# Patient Record
Sex: FEMALE | Race: BLACK OR AFRICAN AMERICAN | NOT HISPANIC OR LATINO | Employment: UNEMPLOYED | URBAN - METROPOLITAN AREA
[De-identification: names, ages, dates, MRNs, and addresses within clinical notes are randomized per-mention and may not be internally consistent; named-entity substitution may affect disease eponyms.]

---
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
## 2022-05-09 ENCOUNTER — TELEPHONE (OUTPATIENT)
Dept: OBSTETRICS AND GYNECOLOGY | Facility: CLINIC | Age: 22
End: 2022-05-09
Payer: COMMERCIAL

## 2022-05-09 NOTE — TELEPHONE ENCOUNTER
----- Message from Carrie Almonte sent at 5/9/2022 10:57 AM CDT -----  Patient returning  truman lto nurse. Call back number [phone]. Janine

## 2022-05-13 ENCOUNTER — OFFICE VISIT (OUTPATIENT)
Dept: OBSTETRICS AND GYNECOLOGY | Facility: CLINIC | Age: 22
End: 2022-05-13
Payer: COMMERCIAL

## 2022-05-13 VITALS
BODY MASS INDEX: 28.32 KG/M2 | SYSTOLIC BLOOD PRESSURE: 137 MMHG | HEIGHT: 65 IN | DIASTOLIC BLOOD PRESSURE: 90 MMHG | WEIGHT: 170 LBS | HEART RATE: 86 BPM

## 2022-05-13 DIAGNOSIS — Z20.2 POTENTIAL EXPOSURE TO STD: ICD-10-CM

## 2022-05-13 DIAGNOSIS — Z01.419 WELL WOMAN EXAM WITH ROUTINE GYNECOLOGICAL EXAM: Primary | ICD-10-CM

## 2022-05-13 PROCEDURE — 99385 PR PREVENTIVE VISIT,NEW,18-39: ICD-10-PCS | Mod: S$GLB,,, | Performed by: OBSTETRICS & GYNECOLOGY

## 2022-05-13 PROCEDURE — 99385 PREV VISIT NEW AGE 18-39: CPT | Mod: S$GLB,,, | Performed by: OBSTETRICS & GYNECOLOGY

## 2022-05-13 RX ORDER — ETONOGESTREL 68 MG/1
68 IMPLANT SUBCUTANEOUS ONCE
COMMUNITY
End: 2022-07-13 | Stop reason: ALTCHOICE

## 2022-05-13 NOTE — PROGRESS NOTES
Cristin Dee is a 21 y.o. female  who presents for a well woman exam.  She has no issues, problems, or complaints. Has had her second Nexplanon in for 1 year now. Periods irregular but not as painful. She is interested in an IUD since she is still in school and no pregnancy plans for quite a while. She also wants one that would decrease or eliminate her periods since she had Nexplanon inserted for that reason as well.    History reviewed. No pertinent past medical history.    History reviewed. No pertinent surgical history.    OB History    Para Term  AB Living   0 0 0 0 0 0   SAB IAB Ectopic Multiple Live Births   0 0 0 0 0       Family History   Problem Relation Age of Onset    No Known Problems Mother     Hypertension Father     No Known Problems Sister     No Known Problems Brother     No Known Problems Maternal Grandmother     No Known Problems Maternal Grandfather     No Known Problems Paternal Grandmother     No Known Problems Paternal Grandfather        Social History     Tobacco Use    Smoking status: Never Smoker    Smokeless tobacco: Never Used   Substance Use Topics    Alcohol use: Yes     Alcohol/week: 1.0 standard drink     Types: 1 Standard drinks or equivalent per week    Drug use: Not Currently         Current Outpatient Medications:     etonogestreL (NEXPLANON) 68 mg Impl subdermal device, 68 mg by Subdermal route once. A single NEXPLANON implant is inserted subdermally just under the skin at the inner side of the non-dominant upper arm., Disp: , Rfl:      Review of patient's allergies indicates:  No Known Allergies     ROS:  GENERAL: Denies weight gain or weight loss. Feeling well overall.   SKIN: Denies rash or lesions.   HEAD: Denies head injury or headache.   NODES: Denies enlarged lymph nodes.   CHEST: Denies shortness of breath.   CARDIOVASCULAR: Denies palpitations or chest pain.   ABDOMEN: Denies abdominal pain, constipation, diarrhea, nausea, vomiting or  "rectal bleeding.   URINARY: Denies frequency, dysuria, hematuria, or burning on urination.  REPRODUCTIVE: See HPI.   BREASTS: Denies pain, lumps, or nipple discharge.   HEMATOLOGIC: Denies easy bruisability or excessive bleeding.  MUSCULOSKELETAL: Denies joint pain or swelling.   NEUROLOGIC: Denies syncope or weakness.   PSYCHIATRIC: Denies depression, anxiety or mood swings.    PHYSICAL EXAM:    BP (!) 137/90   Pulse 86   Ht 5' 5" (1.651 m)   Wt 77.1 kg (170 lb)   LMP 05/13/2022 (Approximate)   BMI 28.29 kg/m²    Body mass index is 28.29 kg/m².     APPEARANCE: Well nourished, well developed, in no acute distress.  AFFECT: WNL, alert and oriented x 3  SKIN: No acne or hirsutism  ABDOMEN: Soft.  No tenderness or masses.  No hepatosplenomegaly.  No hernias.  BREASTS: Symmetrical, no skin changes or visible lesions.  No palpable masses, nipple discharge bilaterally.  PELVIC: Normal external genitalia without lesions.  Normal hair distribution.  Adequate perineal body, normal urethral meatus.  Urethra and bladder without tenderness or masses. Vagina moist and well rugated without lesions or discharge.  Cervix pink, without lesions, discharge or tenderness.  No significant cystocele or rectocele.  Bimanual exam shows uterus to be normal size, regular, mobile and nontender.  Adnexa without masses or tenderness.        Well woman exam with routine gynecological exam  -     C. trachomatis/N. gonorrhoeae by AMP DNA Other; Cervicovaginal    Potential exposure to STD  -     C. trachomatis/N. gonorrhoeae by AMP DNA Other; Cervicovaginal       Mirena IUD discussed and forms completed to order device  Patient was counseled today on A.C.S. Pap guidelines and recommendations for yearly pelvic exams, mammograms and monthly self breast exams; to see her PCP for other health maintenance.     Follow up in 1 year          "

## 2022-05-17 LAB
CHLAMYDIA: NEGATIVE
GONORRHEA: NEGATIVE
SOURCE: NORMAL

## 2022-05-19 ENCOUNTER — TELEPHONE (OUTPATIENT)
Dept: OBSTETRICS AND GYNECOLOGY | Facility: CLINIC | Age: 22
End: 2022-05-19
Payer: COMMERCIAL

## 2022-05-19 NOTE — TELEPHONE ENCOUNTER
----- Message from Carrie Almonte sent at 5/19/2022 10:16 AM CDT -----   Patient's mother need to speak to nurse regarding her medication. Call back number [phone]. Tks

## 2022-05-19 NOTE — TELEPHONE ENCOUNTER
----- Message from Haylie Zavala sent at 5/19/2022 12:25 PM CDT -----  Cristin Dee stated that she received a call stating that her iud will be delivered to the office on the 24th and she would like to schedule the insert procedure. Please give her a call back at [phone]

## 2022-05-20 NOTE — TELEPHONE ENCOUNTER
[facility] was calling to inquire why patient had Nexplanon and Mirena was ordered. [facility] was told that she still had irregular cycles with the Nexplanon. Mirena will be delivered May 24, 2022

## 2022-05-20 NOTE — TELEPHONE ENCOUNTER
----- Message from Carrie Almonte sent at 5/20/2022  9:29 AM CDT -----  Angeles calling from [facility] need to speak to nurse regarding clarification on  prescription. Call back number [phone]. Tks

## 2022-05-24 ENCOUNTER — TELEPHONE (OUTPATIENT)
Dept: OBSTETRICS AND GYNECOLOGY | Facility: CLINIC | Age: 22
End: 2022-05-24
Payer: COMMERCIAL

## 2022-05-24 NOTE — TELEPHONE ENCOUNTER
Spoke with Pt and she states that she and her mother had a 3 way conversation with the insurance company and that the Mirena is 100% covered

## 2022-05-26 ENCOUNTER — OFFICE VISIT (OUTPATIENT)
Dept: OBSTETRICS AND GYNECOLOGY | Facility: CLINIC | Age: 22
End: 2022-05-26
Payer: COMMERCIAL

## 2022-05-26 VITALS
BODY MASS INDEX: 28.09 KG/M2 | WEIGHT: 168.81 LBS | SYSTOLIC BLOOD PRESSURE: 120 MMHG | DIASTOLIC BLOOD PRESSURE: 71 MMHG

## 2022-05-26 DIAGNOSIS — Z30.430 ENCOUNTER FOR INSERTION OF PROGESTIN-RELEASING INTRAUTERINE CONTRACEPTIVE DEVICE (IUD): Primary | ICD-10-CM

## 2022-05-26 DIAGNOSIS — Z30.46 NEXPLANON REMOVAL: ICD-10-CM

## 2022-05-26 PROCEDURE — 58300 INSERTION OF IUD: ICD-10-PCS | Mod: S$GLB,,, | Performed by: OBSTETRICS & GYNECOLOGY

## 2022-05-26 PROCEDURE — 11982 PR REMOVAL DRUG IMPLANT DEVICE: ICD-10-PCS | Mod: S$GLB,,, | Performed by: OBSTETRICS & GYNECOLOGY

## 2022-05-26 PROCEDURE — 11982 REMOVE DRUG IMPLANT DEVICE: CPT | Mod: S$GLB,,, | Performed by: OBSTETRICS & GYNECOLOGY

## 2022-05-26 PROCEDURE — 58300 INSERT INTRAUTERINE DEVICE: CPT | Mod: S$GLB,,, | Performed by: OBSTETRICS & GYNECOLOGY

## 2022-05-26 NOTE — PROGRESS NOTES
Insertion of IUD    Date/Time: 2022 11:15 AM  Performed by: Marielena Vasquez MD  Authorized by: Marielena Vasquez MD     Consent:     Consent obtained:  Verbal    Consent given by:  Patient    Procedure risks and benefits discussed: yes      Patient questions answered: yes      Patient agrees, verbalizes understanding, and wants to proceed: yes    Procedure:     Negative GC/chlamydia test: yes      Cervix cleaned and prepped: yes      Speculum placed in vagina: yes      Tenaculum applied to cervix: yes      Uterus sounded: yes      Uterus sound depth (cm):  7    IUD inserted with no complications: yes      IUD type:  Mirena    Strings trimmed: yes    1 Intra Uterine Device levonorgestreL 20 mcg/24 hours (7 yrs) 52 mg       Post-procedure:     Patient tolerated procedure well: yes          CC: Nexplanon removal    Cristin Dee is a 21 y.o. female   here for NEXPLANON removal  due to irregular menses Patient's last menstrual period was 2022 (approximate).     COUNSELING:  All contraceptive options were reviewed and the patient chooses Mirena IUD after removal of nexplanon.  The procedure and minimal risks of pain, bleeding, bruising and infection at the removal site discussed. Possible irregular menstrual bleeding pattern versus amenorrhea was explained.    EXAM:  With patient in supine position the left arm was flexed at the elbow and externally rotated.  The nexplanon was identified at the inner side of the upper arm overlying the groove between biceps and triceps.    PROCEDURE:    The removal site was prepped with Betadyne and injected with 1 mL of 1% Lidocaine with epinephrine subcutaneously.  Using sterile technique a 11 blade knife was used to make a small incision in the skin. The Nexplanon implant was seen and grasped with a hemostat and removed without difficulty. Adequate hemostasis was noted.  A small adhesive bandage and then a pressure bandage was placed over the removal site.  The patient  tolerated the procedure well.  EBL 1 mL.    ASSESSMENT AND PLAN:    Encounter for insertion of progestin-releasing intrauterine contraceptive device (IUD)  -     Insertion of IUD    Nexplanon removal

## 2022-06-29 ENCOUNTER — TELEPHONE (OUTPATIENT)
Dept: OBSTETRICS AND GYNECOLOGY | Facility: CLINIC | Age: 22
End: 2022-06-29
Payer: MEDICAID

## 2022-06-29 DIAGNOSIS — R10.2 PELVIC PAIN: ICD-10-CM

## 2022-06-29 DIAGNOSIS — Z30.431 IUD CHECK UP: Primary | ICD-10-CM

## 2022-06-30 ENCOUNTER — PROCEDURE VISIT (OUTPATIENT)
Dept: OBSTETRICS AND GYNECOLOGY | Facility: CLINIC | Age: 22
End: 2022-06-30
Payer: MEDICAID

## 2022-06-30 DIAGNOSIS — R10.2 PELVIC PAIN: ICD-10-CM

## 2022-06-30 DIAGNOSIS — Z30.431 IUD CHECK UP: ICD-10-CM

## 2022-06-30 PROCEDURE — 76830 TRANSVAGINAL US NON-OB: CPT | Mod: S$GLB,,, | Performed by: OBSTETRICS & GYNECOLOGY

## 2022-06-30 PROCEDURE — 76830 US OB/GYN PROCEDURE (VIEWPOINT): ICD-10-PCS | Mod: S$GLB,,, | Performed by: OBSTETRICS & GYNECOLOGY

## 2022-06-30 NOTE — TELEPHONE ENCOUNTER
"----- Message from Heather Esposito sent at 6/30/2022  8:44 AM CDT -----  Contact: Patient  Type:  Needs  Advice    Who Called: Patient    Does Dr Vasquez take the medical card "[employer]" ?    Patient has a appt today at 2:30pm please call back and advise      # [phone]      "

## 2022-07-06 ENCOUNTER — TELEPHONE (OUTPATIENT)
Dept: OBSTETRICS AND GYNECOLOGY | Facility: CLINIC | Age: 22
End: 2022-07-06
Payer: MEDICAID

## 2022-07-06 NOTE — TELEPHONE ENCOUNTER
Pt states she was returning Dr lily garay  Told her she would try her back later as she is not in the office  Verbalized understanding

## 2022-07-06 NOTE — TELEPHONE ENCOUNTER
----- Message from Maih Bruce sent at 7/6/2022 11:53 AM CDT -----  .Type:  Patient Returning Call    Who Called:self  Who Left Message for Patient:dr strickland  Does the patient know what this is regarding?:yes  Would the patient rather a call back or a response via [facility]? call  Best Call Back Number:.[phone] (

## 2022-07-07 ENCOUNTER — TELEPHONE (OUTPATIENT)
Dept: OBSTETRICS AND GYNECOLOGY | Facility: CLINIC | Age: 22
End: 2022-07-07
Payer: MEDICAID

## 2022-07-07 NOTE — TELEPHONE ENCOUNTER
----- Message from Nell Sheriff sent at 7/7/2022  1:31 PM CDT -----  Contact: self  Patient called previously and has been waiting on a call back from Dr Vasquez or the nurse in regards to a cyst on her ovary. Please call back at [phone]     Pt spoke with someone from Dr Vasquez's office yesterday. Pt was informed that Dr Vasquez is out of the office and will call her back once she returns. Pt verbalized understanding.

## 2022-07-12 ENCOUNTER — TELEPHONE (OUTPATIENT)
Dept: OBSTETRICS AND GYNECOLOGY | Facility: CLINIC | Age: 22
End: 2022-07-12
Payer: MEDICAID

## 2022-07-12 DIAGNOSIS — N83.202 LEFT OVARIAN CYST: ICD-10-CM

## 2022-07-12 DIAGNOSIS — N83.209 CYST OF OVARY, UNSPECIFIED LATERALITY: ICD-10-CM

## 2022-07-12 NOTE — TELEPHONE ENCOUNTER
----- Message from Nell Sheriff sent at 7/12/2022  8:38 AM CDT -----  Requesting a call back regarding scheduling to see Dr Vasquez for cyst on left ovary. Tried scheduling patient, Epic is blocking. Please call back at [phone]

## 2022-07-12 NOTE — TELEPHONE ENCOUNTER
Spoke with patient she stated that needs to have follow up appt ot check on the cyst on her ovary. She stated that Dr. Vasquez had tried contacting her but when she tried calling back Dr. Vasquez was out of office.

## 2022-07-13 ENCOUNTER — OFFICE VISIT (OUTPATIENT)
Dept: OBSTETRICS AND GYNECOLOGY | Facility: CLINIC | Age: 22
End: 2022-07-13
Payer: MEDICAID

## 2022-07-13 ENCOUNTER — PROCEDURE VISIT (OUTPATIENT)
Dept: OBSTETRICS AND GYNECOLOGY | Facility: CLINIC | Age: 22
End: 2022-07-13
Payer: MEDICAID

## 2022-07-13 VITALS
BODY MASS INDEX: 28.12 KG/M2 | SYSTOLIC BLOOD PRESSURE: 104 MMHG | DIASTOLIC BLOOD PRESSURE: 72 MMHG | WEIGHT: 168.81 LBS | HEIGHT: 65 IN

## 2022-07-13 DIAGNOSIS — N83.202 LEFT OVARIAN CYST: Primary | ICD-10-CM

## 2022-07-13 DIAGNOSIS — N92.1 BREAKTHROUGH BLEEDING ASSOCIATED WITH INTRAUTERINE DEVICE (IUD): ICD-10-CM

## 2022-07-13 DIAGNOSIS — N83.202 LEFT OVARIAN CYST: ICD-10-CM

## 2022-07-13 DIAGNOSIS — Z97.5 BREAKTHROUGH BLEEDING ASSOCIATED WITH INTRAUTERINE DEVICE (IUD): ICD-10-CM

## 2022-07-13 PROCEDURE — 3008F BODY MASS INDEX DOCD: CPT | Mod: CPTII,S$GLB,, | Performed by: OBSTETRICS & GYNECOLOGY

## 2022-07-13 PROCEDURE — 1159F PR MEDICATION LIST DOCUMENTED IN MEDICAL RECORD: ICD-10-PCS | Mod: CPTII,S$GLB,, | Performed by: OBSTETRICS & GYNECOLOGY

## 2022-07-13 PROCEDURE — 99212 OFFICE O/P EST SF 10 MIN: CPT | Mod: 25,S$GLB,, | Performed by: OBSTETRICS & GYNECOLOGY

## 2022-07-13 PROCEDURE — 1159F MED LIST DOCD IN RCRD: CPT | Mod: CPTII,S$GLB,, | Performed by: OBSTETRICS & GYNECOLOGY

## 2022-07-13 PROCEDURE — 3078F PR MOST RECENT DIASTOLIC BLOOD PRESSURE < 80 MM HG: ICD-10-PCS | Mod: CPTII,S$GLB,, | Performed by: OBSTETRICS & GYNECOLOGY

## 2022-07-13 PROCEDURE — 3074F PR MOST RECENT SYSTOLIC BLOOD PRESSURE < 130 MM HG: ICD-10-PCS | Mod: CPTII,S$GLB,, | Performed by: OBSTETRICS & GYNECOLOGY

## 2022-07-13 PROCEDURE — 3008F PR BODY MASS INDEX (BMI) DOCUMENTED: ICD-10-PCS | Mod: CPTII,S$GLB,, | Performed by: OBSTETRICS & GYNECOLOGY

## 2022-07-13 PROCEDURE — 76830 US OB/GYN PROCEDURE (VIEWPOINT): ICD-10-PCS | Mod: S$GLB,,, | Performed by: OBSTETRICS & GYNECOLOGY

## 2022-07-13 PROCEDURE — 99212 PR OFFICE/OUTPT VISIT, EST, LEVL II, 10-19 MIN: ICD-10-PCS | Mod: 25,S$GLB,, | Performed by: OBSTETRICS & GYNECOLOGY

## 2022-07-13 PROCEDURE — 76830 TRANSVAGINAL US NON-OB: CPT | Mod: S$GLB,,, | Performed by: OBSTETRICS & GYNECOLOGY

## 2022-07-13 PROCEDURE — 3078F DIAST BP <80 MM HG: CPT | Mod: CPTII,S$GLB,, | Performed by: OBSTETRICS & GYNECOLOGY

## 2022-07-13 PROCEDURE — 3074F SYST BP LT 130 MM HG: CPT | Mod: CPTII,S$GLB,, | Performed by: OBSTETRICS & GYNECOLOGY

## 2022-07-13 NOTE — PROGRESS NOTES
Chief Complaint: follow up of left ovarian cyst     HPI:      Cristin Dee is a 21 y.o. female  who presents complaining of 6 cm left ovarian cyst on US last week. US today shows the cyst is 2.4 cm. IUD is in correct position.   She denies any more pain. Bleeding is ceasing.      History reviewed. No pertinent past medical history.   History reviewed. No pertinent surgical history.   Social History     Tobacco Use    Smoking status: Never Smoker    Smokeless tobacco: Never Used   Substance Use Topics    Alcohol use: Yes     Alcohol/week: 1.0 standard drink     Types: 1 Standard drinks or equivalent per week    Drug use: Not Currently      Current Outpatient Medications on File Prior to Visit   Medication Sig Dispense Refill    [DISCONTINUED] etonogestreL (NEXPLANON) 68 mg Impl subdermal device 68 mg by Subdermal route once. A single NEXPLANON implant is inserted subdermally just under the skin at the inner side of the non-dominant upper arm.       Current Facility-Administered Medications on File Prior to Visit   Medication Dose Route Frequency Provider Last Rate Last Admin    levonorgestreL (MIRENA) 20 mcg/24 hours (7 yrs) 52 mg IUD 1 Intra Uterine Device  1 Intra Uterine Device Intrauterine  Marielena Vasquez MD   1 Intra Uterine Device at 22 1115      Review of patient's allergies indicates:  No Known Allergies       ROS:     GENERAL: Denies weight gain or weight loss. Feeling well overall.   SKIN: Denies rash or lesions.   NODES: Denies enlarged lymph nodes.   CARDIOVASCULAR: Denies palpitations or chest pain.   ABDOMEN: Denies abdominal pain, constipation, diarrhea, nausea, vomiting or rectal bleeding.   URINARY: Denies frequency, dysuria, hematuria, or burning on urination.  REPRODUCTIVE: See HPI.   BREASTS: Denies pain, lumps, or nipple discharge.   HEMATOLOGIC: Denies easy bruisability or excessive bleeding   PSYCHIATRIC: Denies depression, anxiety or mood swings.   Physical Exam:      BP  "104/72   Ht 5' 5" (1.651 m)   Wt 76.6 kg (168 lb 12.8 oz)   LMP 06/13/2022 (Approximate)   BMI 28.09 kg/m²   Body mass index is 28.09 kg/m².     ABDOMEN: Soft.  No tenderness or masses. No hepatoslenomegaly. No hernias.       Assessment/Plan:     Left ovarian cyst    Breakthrough bleeding associated with intrauterine device (IUD)      She will continue to observe bleeding pattern with IUD. She will call if it becomes heavy. She will call if recurrence of any symptoms regarding cyst to discuss if change to hormone contraceptive needed for cyst prevention.   "

## 2022-08-01 ENCOUNTER — TELEPHONE (OUTPATIENT)
Dept: OBSTETRICS AND GYNECOLOGY | Facility: CLINIC | Age: 22
End: 2022-08-01
Payer: MEDICAID

## 2022-08-01 DIAGNOSIS — N92.1 BREAKTHROUGH BLEEDING ASSOCIATED WITH INTRAUTERINE DEVICE (IUD): Primary | ICD-10-CM

## 2022-08-01 DIAGNOSIS — Z97.5 BREAKTHROUGH BLEEDING ASSOCIATED WITH INTRAUTERINE DEVICE (IUD): Primary | ICD-10-CM

## 2022-08-01 DIAGNOSIS — Z97.5 BREAKTHROUGH BLEEDING ASSOCIATED WITH INTRAUTERINE DEVICE (IUD): ICD-10-CM

## 2022-08-01 DIAGNOSIS — N92.1 BREAKTHROUGH BLEEDING ASSOCIATED WITH INTRAUTERINE DEVICE (IUD): ICD-10-CM

## 2022-08-01 RX ORDER — ESTRADIOL 1 MG/1
1 TABLET ORAL DAILY
Qty: 7 TABLET | Refills: 0 | Status: SHIPPED | OUTPATIENT
Start: 2022-08-01 | End: 2022-08-17

## 2022-08-01 RX ORDER — ESTRADIOL 1 MG/1
1 TABLET ORAL DAILY
Qty: 7 TABLET | Refills: 0 | Status: SHIPPED | OUTPATIENT
Start: 2022-08-01 | End: 2022-08-01 | Stop reason: SDUPTHER

## 2022-08-01 NOTE — TELEPHONE ENCOUNTER
----- Message from Diya Ross sent at 8/1/2022  8:28 AM CDT -----  Regarding: Heavy bleeding  Contact: Patient  .Type:  Needs Medical Advice    Who Called:  Patient  Symptoms (please be specific):  Heavy bleeding  How long has patient had these symptoms:  since May  Pharmacy name and phone #[facility] [place]     Would the patient rather a call back or a response via [facility]? Call  Best Call Back Number:  [phone] (home)     Additional Information:

## 2022-08-01 NOTE — TELEPHONE ENCOUNTER
Pt states she has been bleeding since May but this past week it has gotten heavier and is wanting to know if there something to be given to stop it  Advise please

## 2022-08-01 NOTE — TELEPHONE ENCOUNTER
----- Message from Sadaf Liriano sent at 8/1/2022  9:39 AM CDT -----  Type:  Needs Medical Advice    Who Called: Cristinjoseph Dee    Symptoms (please be specific): -   How long has patient had these symptoms:  -  Pharmacy name and phone #:  [facility] [address]  Would the patient rather a call back or a response via [facility]?    Best Call Back Number: [phone]    Additional Information: pt asks that the medication be sent to [facility] @ [phone]

## 2022-08-01 NOTE — TELEPHONE ENCOUNTER
Please notify pt that prescription sent. If bleeding does not resolve with estrogen tablets this week, please give appt

## 2022-08-01 NOTE — TELEPHONE ENCOUNTER
Spoke with pt.   She wants med. (estradiol) Sent to [facility] (on [facility]) [address] instead.

## 2022-08-15 ENCOUNTER — TELEPHONE (OUTPATIENT)
Dept: OBSTETRICS AND GYNECOLOGY | Facility: CLINIC | Age: 22
End: 2022-08-15
Payer: MEDICAID

## 2022-08-15 NOTE — TELEPHONE ENCOUNTER
----- Message from Nell Sheriff sent at 8/15/2022  2:07 PM CDT -----  Contact: self  Was given medication to help stop her constant bleeding after getting implant. She took the medication and the bleeding did stop for 3 days, but it's back again. Please call back at [phone]

## 2022-08-17 ENCOUNTER — OFFICE VISIT (OUTPATIENT)
Dept: OBSTETRICS AND GYNECOLOGY | Facility: CLINIC | Age: 22
End: 2022-08-17
Payer: MEDICAID

## 2022-08-17 VITALS
BODY MASS INDEX: 27.69 KG/M2 | HEIGHT: 65 IN | WEIGHT: 166.19 LBS | SYSTOLIC BLOOD PRESSURE: 120 MMHG | HEART RATE: 80 BPM | DIASTOLIC BLOOD PRESSURE: 80 MMHG

## 2022-08-17 DIAGNOSIS — Z97.5 BREAKTHROUGH BLEEDING ASSOCIATED WITH INTRAUTERINE DEVICE (IUD): Primary | ICD-10-CM

## 2022-08-17 DIAGNOSIS — N92.1 BREAKTHROUGH BLEEDING ASSOCIATED WITH INTRAUTERINE DEVICE (IUD): Primary | ICD-10-CM

## 2022-08-17 PROCEDURE — 3008F BODY MASS INDEX DOCD: CPT | Mod: CPTII,S$GLB,, | Performed by: OBSTETRICS & GYNECOLOGY

## 2022-08-17 PROCEDURE — 99213 PR OFFICE/OUTPT VISIT, EST, LEVL III, 20-29 MIN: ICD-10-PCS | Mod: S$GLB,,, | Performed by: OBSTETRICS & GYNECOLOGY

## 2022-08-17 PROCEDURE — 3074F PR MOST RECENT SYSTOLIC BLOOD PRESSURE < 130 MM HG: ICD-10-PCS | Mod: CPTII,S$GLB,, | Performed by: OBSTETRICS & GYNECOLOGY

## 2022-08-17 PROCEDURE — 1159F PR MEDICATION LIST DOCUMENTED IN MEDICAL RECORD: ICD-10-PCS | Mod: CPTII,S$GLB,, | Performed by: OBSTETRICS & GYNECOLOGY

## 2022-08-17 PROCEDURE — 3079F DIAST BP 80-89 MM HG: CPT | Mod: CPTII,S$GLB,, | Performed by: OBSTETRICS & GYNECOLOGY

## 2022-08-17 PROCEDURE — 3079F PR MOST RECENT DIASTOLIC BLOOD PRESSURE 80-89 MM HG: ICD-10-PCS | Mod: CPTII,S$GLB,, | Performed by: OBSTETRICS & GYNECOLOGY

## 2022-08-17 PROCEDURE — 3008F PR BODY MASS INDEX (BMI) DOCUMENTED: ICD-10-PCS | Mod: CPTII,S$GLB,, | Performed by: OBSTETRICS & GYNECOLOGY

## 2022-08-17 PROCEDURE — 1159F MED LIST DOCD IN RCRD: CPT | Mod: CPTII,S$GLB,, | Performed by: OBSTETRICS & GYNECOLOGY

## 2022-08-17 PROCEDURE — 3074F SYST BP LT 130 MM HG: CPT | Mod: CPTII,S$GLB,, | Performed by: OBSTETRICS & GYNECOLOGY

## 2022-08-17 PROCEDURE — 99213 OFFICE O/P EST LOW 20 MIN: CPT | Mod: S$GLB,,, | Performed by: OBSTETRICS & GYNECOLOGY

## 2022-08-17 RX ORDER — ESTRADIOL 1 MG/1
1 TABLET ORAL DAILY PRN
Qty: 30 TABLET | Refills: 0 | Status: SHIPPED | OUTPATIENT
Start: 2022-08-17 | End: 2023-08-17

## 2022-08-17 NOTE — PROGRESS NOTES
Chief Complaint: breakthrough bleeding with Mirena     HPI:      Cristin Dee is a 21 y.o. female  who presents for follow up of Mirena  Patient's last menstrual period was 2022 (approximate). She had a Mirena IUD inserted in May. She has had breakthrough bleeding which improves with estradiol. She wants to keep Mirena in since this has been the best birth control for her so far. She would rather deal with the bleeding unless it gets heavy.     History reviewed. No pertinent past medical history.   History reviewed. No pertinent surgical history.   Social History     Tobacco Use    Smoking status: Never Smoker    Smokeless tobacco: Never Used   Substance Use Topics    Alcohol use: Not Currently     Alcohol/week: 1.0 standard drink     Types: 1 Standard drinks or equivalent per week    Drug use: Not Currently      Current Outpatient Medications on File Prior to Visit   Medication Sig Dispense Refill    [DISCONTINUED] estradioL (ESTRACE) 1 MG tablet Take 1 tablet (1 mg total) by mouth once daily. (Patient not taking: Reported on 2022) 7 tablet 0     Current Facility-Administered Medications on File Prior to Visit   Medication Dose Route Frequency Provider Last Rate Last Admin    levonorgestreL (MIRENA) 20 mcg/24 hours (7 yrs) 52 mg IUD 1 Intra Uterine Device  1 Intra Uterine Device Intrauterine  Marielena Vasquez MD   1 Intra Uterine Device at 22 1115      Review of patient's allergies indicates:  No Known Allergies       ROS:     GENERAL: Denies weight gain or weight loss. Feeling well overall.   SKIN: Denies rash or lesions.   NODES: Denies enlarged lymph nodes.   CARDIOVASCULAR: Denies palpitations or chest pain.   ABDOMEN: Denies abdominal pain, constipation, diarrhea, nausea, vomiting or rectal bleeding.   URINARY: Denies frequency, dysuria, hematuria, or burning on urination.  REPRODUCTIVE: See HPI.   BREASTS: Denies pain, lumps, or nipple discharge.   HEMATOLOGIC: Denies easy  "bruisability or excessive bleeding with the exception of menstrual cycles.  PSYCHIATRIC: Denies depression, anxiety or mood swings.   Physical Exam:      /80 (BP Location: Left arm, Patient Position: Sitting, BP Method: Large (Manual))   Pulse 80   Ht 5' 5" (1.651 m)   Wt 75.4 kg (166 lb 3.2 oz)   LMP 08/17/2022 (Approximate) Comment: abnormal bleeding on Mirena  BMI 27.66 kg/m²   Body mass index is 27.66 kg/m².         Assessment/Plan:     Breakthrough bleeding associated with intrauterine device (IUD)  -     estradioL (ESTRACE) 1 MG tablet; Take 1 tablet (1 mg total) by mouth daily as needed.  Dispense: 30 tablet; Refill: 0      Will continue to observe for now and use estradiol prn only  She will follow up at her annual exam  "